# Patient Record
(demographics unavailable — no encounter records)

---

## 2024-10-31 NOTE — DISCUSSION/SUMMARY
[FreeTextEntry1] : R/B of ocp, over 35 and obesity reviewed. She would like to remain on ocp. Recommend changing to loloestrin. She agrees. Will change to loloestrin when done with last 3 packs.

## 2024-10-31 NOTE — HISTORY OF PRESENT ILLNESS
[No] : Patient does not have concerns regarding sex [Currently Active] : currently active [Men] : men [Vaginal] : vaginal [TextBox_4] : Happy with loestrin 1/20, no complaints. Just had COVID, mild symptoms. SA sporadically and wants to remain on OCP. Pap last yr nml hpv+ has 3 packs of loestrin 1/20 left. [Mammogramdate] : 8/2023 [PapSmeardate] : 2023 [TextBox_31] : nml hpv+ [ColonoscopyDate] : not yet [FreeTextEntry2] : sporadically

## 2024-11-22 NOTE — PROCEDURE
[Colposcopy] : Colposcopy  [Time out performed] : Pre-procedure time out performed.  Patient's name, date of birth and procedure confirmed. [Consent Obtained] : Consent obtained [Risks] : risks [Benefits] : benefits [Alternatives] : alternatives [Patient] : patient [Infection] : infection [Bleeding] : bleeding [Allergic Reaction] : allergic reaction [HPV High Risk] : HPV high risk [ECC Performed] : ECC performed [No Abnormalities] : no abnormalities [Biopsy] : biopsy not taken [Hemostasis Obtained] : Hemostasis obtained [Tolerated Well] : the patient tolerated the procedure well [de-identified] : Normal colpo